# Patient Record
Sex: MALE | URBAN - METROPOLITAN AREA
[De-identification: names, ages, dates, MRNs, and addresses within clinical notes are randomized per-mention and may not be internally consistent; named-entity substitution may affect disease eponyms.]

---

## 2020-02-26 ENCOUNTER — TELEPHONE (OUTPATIENT)
Dept: UROLOGY | Facility: MEDICAL CENTER | Age: 50
End: 2020-02-26

## 2020-02-26 NOTE — TELEPHONE ENCOUNTER
Spoke with billing office and was told this office takes his insurance and no referral necessary  Gave pt appt in office tomorrow

## 2020-02-26 NOTE — TELEPHONE ENCOUNTER
TRIAGE NEW PATIENT  Patient called in regard to left testicular pain, nausea, no fever  Noted to be lump on left testicle  [facility] "Hey, Neighbor!" First      ID [id number]     [facility] preferred office  No prior testing noted  Patient can be reached at [phone] to assist with scheduling  Thank you